# Patient Record
Sex: FEMALE | Race: WHITE
[De-identification: names, ages, dates, MRNs, and addresses within clinical notes are randomized per-mention and may not be internally consistent; named-entity substitution may affect disease eponyms.]

---

## 2019-01-08 ENCOUNTER — HOSPITAL ENCOUNTER (INPATIENT)
Dept: HOSPITAL 97 - 4TH | Age: 84
LOS: 3 days | Discharge: HOME | DRG: 378 | End: 2019-01-11
Attending: INTERNAL MEDICINE | Admitting: INTERNAL MEDICINE
Payer: COMMERCIAL

## 2019-01-08 DIAGNOSIS — C50.919: ICD-10-CM

## 2019-01-08 DIAGNOSIS — Z79.01: ICD-10-CM

## 2019-01-08 DIAGNOSIS — Z90.11: ICD-10-CM

## 2019-01-08 DIAGNOSIS — K92.2: Primary | ICD-10-CM

## 2019-01-08 DIAGNOSIS — R63.4: ICD-10-CM

## 2019-01-08 DIAGNOSIS — C78.5: ICD-10-CM

## 2019-01-08 DIAGNOSIS — E78.5: ICD-10-CM

## 2019-01-08 DIAGNOSIS — K64.8: ICD-10-CM

## 2019-01-08 DIAGNOSIS — K29.40: ICD-10-CM

## 2019-01-08 DIAGNOSIS — R13.10: ICD-10-CM

## 2019-01-08 DIAGNOSIS — I71.4: ICD-10-CM

## 2019-01-08 DIAGNOSIS — I48.2: ICD-10-CM

## 2019-01-08 DIAGNOSIS — E55.9: ICD-10-CM

## 2019-01-08 DIAGNOSIS — K57.90: ICD-10-CM

## 2019-01-08 DIAGNOSIS — Z66: ICD-10-CM

## 2019-01-08 DIAGNOSIS — M15.9: ICD-10-CM

## 2019-01-08 DIAGNOSIS — D50.0: ICD-10-CM

## 2019-01-08 DIAGNOSIS — K44.9: ICD-10-CM

## 2019-01-08 DIAGNOSIS — K64.4: ICD-10-CM

## 2019-01-08 LAB
ALBUMIN SERPL BCP-MCNC: 3.3 G/DL (ref 3.4–5)
ALP SERPL-CCNC: 60 U/L (ref 45–117)
ALT SERPL W P-5'-P-CCNC: 10 U/L (ref 12–78)
AST SERPL W P-5'-P-CCNC: 8 U/L (ref 15–37)
BUN BLD-MCNC: 17 MG/DL (ref 7–18)
FERRITIN SERPL-MCNC: 11.1 NG/ML (ref 8–388)
GLUCOSE SERPLBLD-MCNC: 101 MG/DL (ref 74–106)
HCT VFR BLD CALC: 24.1 % (ref 36–45)
INR BLD: 1.39
IRON SERPL-MCNC: 14 UG/DL (ref 50–170)
LYMPHOCYTES # SPEC AUTO: 2.1 K/UL (ref 0.7–4.9)
MAGNESIUM SERPL-MCNC: 2.1 MG/DL (ref 1.8–2.4)
PMV BLD: 8.4 FL (ref 7.6–11.3)
POTASSIUM SERPL-SCNC: 4.4 MMOL/L (ref 3.5–5.1)
RBC # BLD: 3.18 M/UL (ref 3.86–4.86)
TRANSFERRIN SERPL-MCNC: 317 MG/DL (ref 200–360)

## 2019-01-08 PROCEDURE — 82607 VITAMIN B-12: CPT

## 2019-01-08 PROCEDURE — 86850 RBC ANTIBODY SCREEN: CPT

## 2019-01-08 PROCEDURE — 82747 ASSAY OF FOLIC ACID RBC: CPT

## 2019-01-08 PROCEDURE — 83735 ASSAY OF MAGNESIUM: CPT

## 2019-01-08 PROCEDURE — 86901 BLOOD TYPING SEROLOGIC RH(D): CPT

## 2019-01-08 PROCEDURE — 85014 HEMATOCRIT: CPT

## 2019-01-08 PROCEDURE — 82378 CARCINOEMBRYONIC ANTIGEN: CPT

## 2019-01-08 PROCEDURE — 36415 COLL VENOUS BLD VENIPUNCTURE: CPT

## 2019-01-08 PROCEDURE — 85025 COMPLETE CBC W/AUTO DIFF WBC: CPT

## 2019-01-08 PROCEDURE — 85018 HEMOGLOBIN: CPT

## 2019-01-08 PROCEDURE — 88305 TISSUE EXAM BY PATHOLOGIST: CPT

## 2019-01-08 PROCEDURE — 71046 X-RAY EXAM CHEST 2 VIEWS: CPT

## 2019-01-08 PROCEDURE — 86900 BLOOD TYPING SEROLOGIC ABO: CPT

## 2019-01-08 PROCEDURE — 84466 ASSAY OF TRANSFERRIN: CPT

## 2019-01-08 PROCEDURE — 83540 ASSAY OF IRON: CPT

## 2019-01-08 PROCEDURE — 82728 ASSAY OF FERRITIN: CPT

## 2019-01-08 PROCEDURE — 80048 BASIC METABOLIC PNL TOTAL CA: CPT

## 2019-01-08 PROCEDURE — 85730 THROMBOPLASTIN TIME PARTIAL: CPT

## 2019-01-08 PROCEDURE — 85610 PROTHROMBIN TIME: CPT

## 2019-01-08 PROCEDURE — 74177 CT ABD & PELVIS W/CONTRAST: CPT

## 2019-01-08 PROCEDURE — 88312 SPECIAL STAINS GROUP 1: CPT

## 2019-01-08 PROCEDURE — 80053 COMPREHEN METABOLIC PANEL: CPT

## 2019-01-08 RX ADMIN — METOPROLOL TARTRATE SCH MG: 50 TABLET, FILM COATED ORAL at 21:42

## 2019-01-08 RX ADMIN — SODIUM CHLORIDE SCH MLS: 0.9 INJECTION, SOLUTION INTRAVENOUS at 21:43

## 2019-01-08 NOTE — XMS REPORT
Clinical Summary

 Created on:2019



Patient:Lauren Thornton

Sex:Female

:1930

External Reference #:[id number]





Demographics







 Address  [address] [place] [address]

 

 Mobile Phone  [phone]

 

 Home Phone  [phone]

 

 Email Address  [email or web address]

 

 Preferred Language  English

 

 Marital Status  

 

 Quaker Affiliation  Unknown

 

 Race  White

 

 Ethnic Group  Not  or 









Author







 Organization  [facility]

 

 Address  [address] [place] [address]









Support







 Name  Relationship  Address  Phone

 

 Lilibeth Smith  Unavailable  Unavailable  [phone]









Care Team Providers







 Name  Role  Phone

 

 Bradley Jackson MD  Primary Care Provider  [phone]









Allergies







 Active Allergy  Reactions  Severity  Noted Date  Comments

 

 Codeine      2018  No pain medication







Medications







 Medication  Sig  Dispensed  Refills  Start Date  End Date  Status

 

 warfarin (COUMADIN) 2.5  Take 2.5 mg by    0      Active



 MG tablet  mouth daily.          

 

 carvedilol (COREG) 12.5  Take 12.5 mg by    0      Active



 MG tablet  mouth 2 (two)          



   times a day with          



   meals.          

 

 losartan (COZAAR) 50 MG  Take 50 mg by    0      Active



 tablet  mouth daily.          

 

 multivitamin with  Take 1 tablet by    0      Active



 minerals tablet  mouth daily.          







Active Problems







 Problem  Noted Date

 

 Essential hypertension  2018

 

 Abdominal aortic aneurysm (AAA) without rupture  2018

 

 Chronic atrial fibrillation  2018







Encounters







 Date  Type  Specialty  Care Team  Description

 

 2018  Office Visit  Cardiology  Carl Baez MD  Abdominal aortic 
aneurysm (AAA) without rupture (Primary Dx);



         Essential hypertension;



         Chronic atrial fibrillation



after 2018



Social History







 Tobacco Use  Types  Packs/Day  Years Used  Date

 

 Former Smoker  Cigarettes      









 Smokeless Tobacco: Never Used      









 Comments: quit 40yrs ago









 Alcohol Use  Drinks/Week  oz/Week  Comments

 

 No      









 Sex Assigned at Birth  Date Recorded

 

 Not on file  









 Job Start Date  Occupation  Industry

 

 Not on file  Not on file  Not on file









 Travel History  Travel Start  Travel End









 No recent travel history available.







Last Filed Vital Signs







 Vital Sign  Reading  Time Taken

 

 Blood Pressure  213/84  2018  2:52 PM CDT

 

 Pulse  67  2018  2:52 PM CDT

 

 Temperature  -  -

 

 Respiratory Rate  -  -

 

 Oxygen Saturation  -  -

 

 Inhaled Oxygen Concentration  -  -

 

 Weight  62.6 kg (138 lb)  2018  2:52 PM CDT

 

 Height  -  -

 

 Body Mass Index  -  -







Plan of Treatment







 Health Maintenance  Due Date  Last Done  Comments

 

 SHINGLES VACCINES (1 of 2)  1980    

 

 PNEUMOCOCCAL POLYSACCHARIDE VACCINE AGE 65 AND OVER  1995    

 

 PNEUMOCOCCAL-13  1995    

 

 INFLUENZA VACCINE  2018    







Procedures







 Procedure Name  Priority  Date/Time  Associated Diagnosis  Comments

 

 ECG 12-LEAD  Routine  2018  1:58 PM  Essential hypertension  Results for 
this



     CDT    procedure are in



         the results



         section.



after 2018



Results

ECG 12 lead (2018  1:58 PM CDT)





 Component  Value  Ref Range  Performed At

 

 Ventricular rate  66    [facility]

 

 Atrial rate  68    [facility]

 

 QRSD interval  78    [facility]

 

 QT interval  408    [facility]

 

 QTC interval  427    [facility]

 

 QRS axis 1  -75    [facility]

 

 T wave axis  81    [facility]

 

 EKG impression  Atrial fibrillation-Left axis    [facility]



   deviation-Anteroseptal infarct , age    



   undetermined-Abnormal ECG-No previous ECGs    



   available-Electronically Signed By Elham Griffiths MD ([id number]) on 2018 12:25:15 PM    









 Performing Organization  Address  City/State/Zipcode  Phone Number

 

 [facility]  [address] [place] [address]  



after 2018



Insurance







 Payer  Benefit Plan / Group  Subscriber ID  Type  Phone  Address

 

 MEDICARE  MEDICARE PART A AND B  xxxxxxxxxx  Medicare [place], [place]









 Guarantor Name  Account Type  Relation to  Date of  Phone  Billing



     Patient  Birth    Address

 

 Lauren Thornton  Personal/Family  Self  1930  [phone] [address] [place], [place]



           [address]







Advance Directives

Patient has advance care planning documents on file. For more information, 
please contact:Aba Wood[address] [place] [address]

## 2019-01-09 LAB — HCT VFR BLD CALC: 33 % (ref 36–45)

## 2019-01-09 PROCEDURE — 30233N1 TRANSFUSION OF NONAUTOLOGOUS RED BLOOD CELLS INTO PERIPHERAL VEIN, PERCUTANEOUS APPROACH: ICD-10-PCS

## 2019-01-09 RX ADMIN — METOCLOPRAMIDE SCH MG: 5 INJECTION, SOLUTION INTRAMUSCULAR; INTRAVENOUS at 16:53

## 2019-01-09 RX ADMIN — METOPROLOL TARTRATE SCH MG: 50 TABLET, FILM COATED ORAL at 21:26

## 2019-01-09 RX ADMIN — METOCLOPRAMIDE SCH MG: 5 INJECTION, SOLUTION INTRAMUSCULAR; INTRAVENOUS at 13:46

## 2019-01-09 RX ADMIN — SODIUM CHLORIDE SCH MG: 0.9 INJECTION, SOLUTION INTRAVENOUS at 09:00

## 2019-01-09 RX ADMIN — METOCLOPRAMIDE SCH MG: 5 INJECTION, SOLUTION INTRAMUSCULAR; INTRAVENOUS at 23:31

## 2019-01-09 RX ADMIN — METOPROLOL TARTRATE SCH MG: 50 TABLET, FILM COATED ORAL at 12:54

## 2019-01-09 NOTE — RAD REPORT
EXAM DESCRIPTION:  CTAbdomen   Pelvis W Contrast - 1/9/2019 10:21 am

 

CLINICAL HISTORY:  Abdominal pain.

abd pain, weight loss

 

COMPARISON:  Abdomen   Pelvis W Contrast dated 2/28/2018; CT ABD PELVIS W CONTRAST dated 7/31/2008

 

TECHNIQUE:  Biphasic CT imaging of the abdomen and pelvis was performed with 100 ml non-ionic IV cont
rast.

 

All CT scans are performed using dose optimization technique as appropriate and may include automated
 exposure control or mA/KV adjustment according to patient size.

 

FINDINGS:  Small bilateral pleural effusions are noted. The inferior lung bases are emphysematous.Mil
d cardiomegaly.

 

Diffuse fatty liver is seen. The spleen, adrenal glands are within normal limits. 26 mm benign-appear
ing cyst is present right kidney. 3 mm stone is present inferior calyx right kidney. No significant h
ydronephrosis.

 

Pancreas appears mildly atrophic. Small cystic area again seen in the body of the pancreas. The size 
of the cystic lesions measures approximately 20 x 15 mm, appearing slightly increased in size since t
he comparative study (previously 20 x 12 mm).

 

No bowel obstruction, free air, intra-abdominal free fluid or abscess. Infrarenal abdominal aortic an
eurysm is present measuring 4.8 cm in anterior-posterior dimension. This represents no significant ch
nu in size since comparative study. The appendix appears absent. No evidence of significant lymphad
enopathy.

 

Lumbar postsurgical changes are evident. Mild fluid is seen dependently in the pelvis.

 

IMPRESSION:  Hypodense pancreatic body lesions have mildly increased in size since the comparative st
udy. Cystadenoma or IPMN is the favored etiology.

 

Stable infrarenal abdominal aortic aneurysm.

 

Nonobstructing inferior right renal stone.

 

Small bilateral pleural effusions.

## 2019-01-09 NOTE — RAD REPORT
EXAM DESCRIPTION:  RAD - Chest Pa And Lat (2 Views) - 1/9/2019 10:15 am

 

CLINICAL HISTORY:  weight loss

Chest pain.

 

COMPARISON:  Chest Pa And Lat (2 Views) dated 3/18/2017; Chest Pa And Lat (2 Views) dated 1/9/2017

 

FINDINGS:  Emphysematous changes are present. Trace right pleural effusion. The heart is mildly moder
ately enlarged. Aortic atherosclerosis. No displaced fractures. Right axillary harpreet dissection clips
.

 

IMPRESSION:  Diffuse COPD.

 

Trace right pleural fluid.

## 2019-01-09 NOTE — HP
Date of Admission:  01/08/2019



Reason For Admission:  Anemia.



History Of Present Illness:  An 88-year-old female patient who had outpatient 
blood work done yesterday, and today her hemoglobin was reported to be 6.4.  So
, the patient was contacted with these results and was advised to get admitted 
to the hospital.  Her family took her to the hospital this evening per 
recommendation and the patient was admitted to the hospital.  I went to check 
on her.  The patient's granddaughter was with her at bedside.  She denies any 
blood in stool.  She has some vague abdominal discomfort, some constipation.  
Her appetite has been poor and over period of time she is slowly losing weight.
  She lives at home by herself and today family reported that she is having 
some trouble swallowing also.  She denies any black stool, blood in stool, 
blood in urine.  No hematemesis. Patient is symptomatic from this anemia, she 
has weakness, tiredness and feels sleepy more than usual. 



Allergies:  CODEINE, DIAZEPAM, DEMEROL, MORPHINE.



Medications:  Warfarin which is being managed by Dr. Hedrick and takes 4 mg 
daily as the patient's family informs me, vitamin D 1000 units 2 times a day, 
vitamin B12 500 mcg daily, metoprolol 25 mg 2 times a day, losartan 50 mg p.o. 
daily.



Review of Systems:

Constitutional: Weight loss. 

GI:  As mentioned above. 

All other systems reviewed and negative.



Past Medical History:  Significant for pancreatic cyst, abdominal aortic 
aneurysm, atrial fibrillation, hypertension, chronic anticoagulation therapy, 
hyperlipidemia, diverticulosis, breast cancer.



Past Surgical History:  Left rotator cuff repair in 2001, appendectomy, 
cholecystectomy, hysterectomy, knee replacement and right breast mastectomy due 
to breast cancer.



Family History:  Not pertinent.



Social History:  Negative.



Physical Examination:

Vital signs:  Initial temperature 98.2, pulse 93, respiratory rate 17, blood 
pressure 156/105, height 5 feet 3 inches, weight 131 pounds. 

General:  Awake, alert, oriented, not in distress. 

HEENT:  Head atraumatic, normocephalic.  Conjunctivae nonerythematous.  Sclerae 
white.  Mouth, no thrush or edema noted.  Ears/Nose, no mass, lesion, discharge 
noted. 

Neck:  Supple. No JVD, lymph nodes, bruit, thyromegaly noted. 

Lungs:  Bilateral good equal air entry. Clear to auscultation. No rhonchi.  No 
rales. 

Heart:  Normal heart sounds, no murmur or gallop. 

Abdomen:  Soft.  Some mild tenderness present.  No rebound tenderness.  No 
distention.  Bowel sounds normoactive.  No hepatosplenomegaly. 

Extremities:  Trace leg edema.  No calf tenderness. 

Skin:  No rash, ulcer, cellulitis. 

Lymphatics:  No lymph node enlargement in neck, supraclavicular, 
infraclavicular region. 

Neuro:  No focal neurological deficit. 

Chest:  Unremarkable. 

External Genitalia:  Deferred. 

Rectal:  Deferred.



Laboratory Data:  White count 4.7, hemoglobin 7.1, platelets 164.  INR 1.39.  
Sodium 142, potassium 4.4, chloride 108, bicarb 26, BUN 17, creatinine 1.19, 
glucose 101, total iron 14, TIBC 444, ferritin 11.1, B12 of 345, folic acid 
level pending.  Liver enzymes unremarkable.



Impression:  

1.   Anemia.

2.   Chronic atrial fibrillation.

3.   Hypertension.

4.   Weight loss.

5.   Dysphagia.

6.   Osteoarthritis, multiple sites.

7.   Hyperlipidemia.

8.   Diverticulosis.

9.   Breast cancer.

10.   Vitamin D deficiency.

11.   Abdominal aortic aneurysm.



Plan:  Admit the patient to hospital for further evaluation and management of 
this problem.  The patient is appropriate for inpatient and is expected to 
spend 2 midnights in hospital.  We will continue her home medications per order
, give her IV fluid per order.  SCD was ordered for DVT prophylaxis.  We will 
go ahead and start to make arrangements for 2 units of packed red cell blood 
transfusion for this symptomatic severe anemia.  Stool for guaiac was ordered.  
We will consult gastroenterologist, Dr. Saldana and tomorrow I will order a 
chest x-ray and CAT scan of abdomen.  We will get post transfusion hemoglobin 
and then decide if any further blood transfusion is necessary or not.  We will 
give IV Protonix.  Advanced directives discussed with the patient in presence 
of her granddaughter and the patient informed me clearly in the event of 
cardiopulmonary arrest she does not want any heroic measures like CPR, 
defibrillation or ventilator support and DNR order was written in the chart.





CANDI/MODL

DD:  01/08/2019 22:27:29   Voice ID:  [id number]

[email or web address]

## 2019-01-10 PROCEDURE — 0DBK8ZX EXCISION OF ASCENDING COLON, VIA NATURAL OR ARTIFICIAL OPENING ENDOSCOPIC, DIAGNOSTIC: ICD-10-PCS

## 2019-01-10 PROCEDURE — 0DB88ZX EXCISION OF SMALL INTESTINE, VIA NATURAL OR ARTIFICIAL OPENING ENDOSCOPIC, DIAGNOSTIC: ICD-10-PCS

## 2019-01-10 PROCEDURE — 0DBL8ZX EXCISION OF TRANSVERSE COLON, VIA NATURAL OR ARTIFICIAL OPENING ENDOSCOPIC, DIAGNOSTIC: ICD-10-PCS

## 2019-01-10 PROCEDURE — 0DB68ZX EXCISION OF STOMACH, VIA NATURAL OR ARTIFICIAL OPENING ENDOSCOPIC, DIAGNOSTIC: ICD-10-PCS

## 2019-01-10 RX ADMIN — SODIUM CHLORIDE SCH MLS: 0.9 INJECTION, SOLUTION INTRAVENOUS at 06:38

## 2019-01-10 RX ADMIN — METOPROLOL TARTRATE SCH MG: 50 TABLET, FILM COATED ORAL at 22:00

## 2019-01-10 RX ADMIN — SODIUM CHLORIDE, SODIUM LACTATE, POTASSIUM CHLORIDE, AND CALCIUM CHLORIDE ONE MLS: .6; .31; .03; .02 INJECTION, SOLUTION INTRAVENOUS at 11:22

## 2019-01-10 RX ADMIN — METOPROLOL TARTRATE SCH: 50 TABLET, FILM COATED ORAL at 09:00

## 2019-01-10 RX ADMIN — SODIUM CHLORIDE SCH MG: 0.9 INJECTION, SOLUTION INTRAVENOUS at 09:04

## 2019-01-10 RX ADMIN — SODIUM CHLORIDE, SODIUM LACTATE, POTASSIUM CHLORIDE, AND CALCIUM CHLORIDE ONE MLS: .6; .31; .03; .02 INJECTION, SOLUTION INTRAVENOUS at 10:28

## 2019-01-10 NOTE — PN
Date of Progress Note:  01/09/2019



Subjective:  The patient was seen this morning for followup.  She was sitting in chair, getting her s
econd unit of blood transfusion.  Her son was present with her at bedside.  No new complaints or prob
lems reported overnight.



Objective:  Vital Signs:  Reviewed. 

HEENT Examination:  Unremarkable. 

Lungs:  Clear to auscultation. 

Heart:  Sounds normal. 

Abdomen:  Soft.  Bowel sounds normal.  No guarding, rigidity, tenderness, or distention. 

Extremity Exam:  No leg edema.



Impression:  

1.Anemia.

2.Pancreatic mass.

3.Abdominal aortic aneurysm.

4.Atrial fibrillation.

5.Hypertension.



Plan:  We will continue current antihypertensive medication per order.  Follow up on hemoglobin and t
hen decide if more blood transfusion is needed or not.  Consult gastroenterologist, Dr. Saldana, for E
GD and colonoscopy.  The patient has some dysphagia problem, is losing weight, and has anemia, so we 
need to make sure there is no underlying GI malignancy or any other explanation for her anemia proble
m.  Stool occult blood is pending.  CAT scan of the abdomen done today, results reviewed.  Pancreatic
 mass has gotten slightly larger than what it was last year.  An abdominal aortic aneurysm is stable.
  I will have to review my office record as the patient was asked to see subspecialist for this new parisa jimenez.  My recollection is probably that she did not 

want to follow up with any subspecialist.  Chest x-ray was unremarkable today.





CANDI/MODL

DD:  01/09/2019 19:57:41Voice ID:  [id number]

DT:  01/10/2019 01:19:21Report ID:  [id number]

## 2019-01-10 NOTE — ENDO RPT
[address], [address]





EGD PROCEDURE REPORT     EXAM DATE: 01/10/2019



PATIENT NAME:          Lauren Thornton          MR#:       [id number]



YOB: 1930     VISIT #:     [id number]

ATTENDING:     Michael Saldana Dr     STATUS:     inpatient - [facility]

ASSISTANT:      Jaylin Jacome, Wendy Jacome, and Maritza Cain RN





INDICATIONS:  The patient is a 88 yr old Female here for an EGD due to iron

deficiency anemia

PROCEDURE PERFORMED:     EGD with biopsy

MEDICATIONS:     Per Anesthesia.

TOPICAL ANESTHETIC:     none



CONSENT:  The patient understands the risks and benefits of the procedure and

understands that these risks include, but are not limited to: sedation,

allergic reaction, infection, perforation and/or bleeding. Alternative means of

evaluation and treatment include, among others: physical exam, x-rays, and/or

surgical intervention. The patient elects to proceed with this endoscopic

procedure.



DESCRIPTION OF PROCEDURE:  During intra-op preparation period all mechanical 

medical equipment was checked for proper function. Hand hygiene and appropriate

measures for infection prevention was taken.  Procedure, possible

complications, and alternatives including but not limited to the possibility of

bleeding, perforation, tear, infection, sepsis, need for surgery, need for

blood transfusion, and anesthesia related complications were explained to the

patient.  After the risks, benefits and alternatives of the procedure were

thoroughly explained, Informed consent was verified, confirmed and timeout was

successfully executed by the treatment team. The patient was  placed in the

left lateral position.  The patient was anesthetized with topical anesthesia.

Through the anesthetized oropharyngeal area, the scope was passed without any

difficulty.  The [id number] ([id number]) endoscope was introduced through the mouth

and advanced to the second portion of the duodenum.  Retroflexed views revealed

a small hiatal hernia.  The gastroscope was then slowly withdrawn and removed.



A small hiatal hernia was found Moderate Atrophic gastritis was found in the

total stomach.  Multiple biopsies were obtained and sent to pathology. Small

bowel biopsies obtained with history of iron deficiency anemia.







ADVERSE EVENTS:     There were no complications.

IMPRESSIONS:     1.  Small hiatal hernia

2.  Moderate atrophic gastritis in the total stomach, s/p biopsies

3.  Small bowel biopsies obtained with history of iron deficiency anemia





RECOMMENDATIONS:     1.  await biopsy results

2.  acid suppression therapy

REPEAT EXAM:





___________________________________

Michael Saldana Dr

eSigned:  Michael Saldana Dr 01/10/2019 12:15 PM





cc: Bradley Jackson



CPT CODES:

ICD9 CODES:





PATIENT NAME:  Lauren Thornton

MR#: [id number]

## 2019-01-10 NOTE — ENDO RPT
[address], [address]





COLONOSCOPY PROCEDURE REPORT     EXAM DATE: 01/10/2019



PATIENT NAME:      Lauren Thornton           MR #:      [id number]

YOB: 1930      VISIT #:     [id number]

ATTENDING:     Michael Saldana Dr     STATUS:     inpatient - [facility]

ASSISTANT:      Jaylin Jacome, Wendy Jacome, and Maritza Cain RN





INDICATIONS:  The patient is a 88 yr old Female here for a colonoscopy due to

iron deficiency anemia

PROCEDURE PERFORMED:     Colonoscopy with biopsy and Colonoscopy with biopsy -

cold polypectomy

MEDICATIONS:     Per Anesthesia.

ESTIMATED BLOOD LOSS:     None



CONSENT: The patient understands the risks and benefits of the procedure and

understands that these risks include, but are not limited to: sedation,

allergic reaction, infection, perforation and/or bleeding. Alternative means of

evaluation and treatment include, among others: physical exam, x-rays, and/or

surgical intervention. The patient elects to proceed with this endoscopic

procedure.



DESCRIPTION OF PROCEDURE:  During intra-op preparation period all mechanical 

medical equipment was checked for proper function. Hand hygiene and appropriate

measures for infection prevention was taken.  Procedure, possible

complications, alternatives including, but not limited to possibility of

bleeding, perforation, tear, infection, sepsis, need for surgery, need for

blood transfusion, were explained to the patient.  After the risks, benefits

and alternatives of the procedure were thoroughly explained, Informed consent

was verified, confirmed and timeout was successfully executed by the treatment

team.  The patient was placed in the left lateral position.   A digital rectal

exam was performed and revealed several skin tags and A digital rectal exam was

performed and revealed external hemorrhoids.  After appropriate level of

anesthesia, the scope was passed.  The [id number] ([id number]) and [id number]

([id number]) endoscope was introduced through the anus and advanced to the

terminal ileum which was intubated for a short distance. The quality of the

prep was good. The instrument was then slowly withdrawn as the colon was fully

examined.  Scope withdrawal time was 8 minutes.



COLON FINDINGS: A 1/3 circumferential, non-obstructing and ulcerated mildly

bleeding malignant tumor/mass, measuring 3 cm in length, was seen in the

ascending colon.  Multiple biopsies of the lesion were performed using cold

forceps.   A sessile polyp measuring 4 mm in size was found in the transverse

colon.  A polypectomy was performed with cold forceps.   Small internal and

external hemorrhoids were found.  Retroflexed views revealed small hemorrhoids.

The scope was then completely withdrawn from the patient and the procedure

terminated.







ADVERSE EVENTS:      There were no complications.

IMPRESSIONS:     1.  3 cm mildly bleeding malignant tumor/mass in the mid to

distal ascending colon; multiple biopsies of the lesion were performed

2.  4 mm sessile polyp in the transverse colon; polypectomy was performed with

cold forceps

3.  Small internal and external hemorrhoids

4.  Intubation to terminal ileum



RECOMMENDATIONS:     1.  await biopsy results

2.  avoid NSAIDS for 2 weeks

3.  surgery

4.  once pathology confirms neoplasia, oncology consult

RECALL:     Return in 1 year(s) for Colonoscopy.



_____________________________

Michael Saldana Dr

eSigned:  Michael Saldana Dr 01/10/2019 12:27 PM





cc:  Braldey Jackson



CPT CODES:

ICD9 CODES:





PATIENT NAME:  Lauren Thornton

MR#: [id number]

## 2019-01-11 LAB
ANISOCYTOSIS BLD QL: (no result)
BLD SMEAR INTERP: (no result)
BUN BLD-MCNC: 14 MG/DL (ref 7–18)
GLUCOSE SERPLBLD-MCNC: 80 MG/DL (ref 74–106)
HCT VFR BLD CALC: 31.3 % (ref 36–45)
LYMPHOCYTES # SPEC AUTO: 1.4 K/UL (ref 0.7–4.9)
MAGNESIUM SERPL-MCNC: 1.8 MG/DL (ref 1.8–2.4)
MORPHOLOGY BLD-IMP: (no result)
PMV BLD: 8.5 FL (ref 7.6–11.3)
POTASSIUM SERPL-SCNC: 3.8 MMOL/L (ref 3.5–5.1)
RBC # BLD: 3.97 M/UL (ref 3.86–4.86)

## 2019-01-11 RX ADMIN — METOPROLOL TARTRATE SCH MG: 50 TABLET, FILM COATED ORAL at 09:40

## 2019-01-11 RX ADMIN — SODIUM CHLORIDE SCH MG: 0.9 INJECTION, SOLUTION INTRAVENOUS at 09:42

## 2019-01-11 NOTE — PN
Date of Progress Note:  01/10/2019



Subjective:  The patient was seen this morning for followup and I saw her this 
evening as well.  This morning, when I saw her, she denied any complaints.



Objective:  Vital Signs:  Reviewed. 

HEENT Examination:  Unremarkable. 

Lungs:  Clear to auscultation. 

Heart:  Sounds normal. 

Abdomen:  Soft.  Bowel sounds normal.  No guarding, rigidity, tenderness, or 
distention. 

Extremity Exam:  No leg edema.



Laboratory Data:  Last hemoglobin was 10.3.  The patient had EGD and 
colonoscopy done today and Dr. Saldana did call me to discuss results.



Impression:  

1.   Anemia due to gastrointestinal blood loss.

2.   Atrial fibrillation.

3.   Hypertension.

4.   Weight loss.

5.   Rule out colon cancer.

6.   Hiatal hernia.

7.   Atrophic gastritis.



Plan:  Dr. Saldana called and informed me about findings of EGD and colonoscopy, 
and family is aware of this hopefully by next week, we should have the biopsy 
results back, but the patient was found to have mass in the ascending colon 
suspicious for colon cancer.  We will go ahead and repeat blood work tomorrow 
morning.  Depending on the patient's condition and blood test results, our plan 
is to discharge her to go home tomorrow, and once we have the biopsy results, 
then plan is to refer her to [facility].  The patient has evaluation done at 
[facility] for her pancreatic mass last year, and CAT scan done during this 
hospitalization shows increase in size of this mass, so she will need to go 
back there for further evaluation along with that.  Once we get the biopsy 
results on this ascending colon mass, we will make the referral as we are 
concerned about the colon cancer.  Details of plan of treatment discussed with 
the patient and family member this evening.  I also informed the patient and 

family members that the patient should not take any warfarin anymore because of 
this GI blood loss anemia problem.





CANDI/MODL

DD:  01/10/2019 20:24:21   Voice ID:  [id number]

DT:  01/11/2019 01:27:47   Report ID:  [id number]

CORRIE

## 2019-01-21 NOTE — DS
Date of Discharge:  01/11/2019



Disposition:  Discharged to go home.



Physical Examination:

HEENT:  Unremarkable. 

Lungs:  Clear to auscultation. 

Heart:  Sounds normal. 

Abdomen:  Soft.  Bowel sounds normal.  No guarding, rigidity, tenderness, or distention. 

Extremities:  No leg edema.



Discharge Medications And Order:  

1.Continue prior home medication except stop warfarin.

2.Take Hemocyte Plus 1 tablet by mouth daily.

3.Come to office for nonfasting blood test to be done on January 21st, January 28th and February 4, 2019 and follow up at my office on January 30, 2019 at 10 a.m.



Laboratory Data:  Labs done during this hospitalization:  Initial white count 4.7, hemoglobin 7.1, pl
atelets 164.  After blood transfusion, hemoglobin came up to 10.5 and last hemoglobin on the day of d
ischarge was 9.9, platelets 132, white count 4.9.  Her INR when she came in was 1.39.  Her chemistry 
when she came in; sodium 142, potassium 4.4, chloride 108, bicarb 26, BUN 17, creatinine 1.19, glucos
e 101.  Liver function tests unremarkable.  CEA 1.8.  B12 of 345, folic acid more than 1000.



Procedures:  Procedures done during this hospitalization include EGD and colonoscopy.  EGD showed a s
mall hiatal hernia, moderate atrophic gastritis.  Colonoscopy showed a 3 cm mass in the ascending col
on and biopsy came back positive for adenocarcinoma.  There was another 4 mm polyp in transverse colo
n, small internal and external hemorrhoid.



Hospital Course:  An 88-year-old female patient who was admitted to the hospital as a direct admissio
n after her outpatient routine blood work revealed hemoglobin of 6.4.  The patient was contacted, was
 requested to come to the hospital. After she was admitted to the hospital, we did repeat blood work.
  Hemoglobin came back 7.1.  Two units of PRBC blood transfusion was ordered for her and GI consultat
ion was requested from Dr. Saldana.  The patient takes warfarin which is being managed by her cardiolo
gist, Dr. Hedrick in [place].  After this significant anemia problem, it was recommended for her to 
discontinue warfarin completely and we did not give any warfarin during this hospitalization.  Dr. Rigo barfield saw her from GI, did EGD and colonoscopy with findings as mentioned above.  Details were discuss
ed with the patient's family members.  The patient's CAT scan had shown a pancreatic mass which appea
rs to be larger than what it was before and after the last CT scan, the patient did go to [facility]
 for evaluation on this pancreatic mass, but she has not returned back for any further followup or te
sting.  So with this in mind, we will have the patient follow up at [facility] on outpatient basis f
or this colon cancer as well as pancreatic mass and my office will initiate this referral and all the
se details were discussed with family members.  The patient and family both were advised not to take 
any warfarin.



Final Diagnoses:  

1.Anemia due to chronic gastrointestinal blood loss.

2.Colon cancer, ascending colon.

3.Hiatal hernia.

4.Chronic atrophic gastritis.

5.Chronic atrial fibrillation.

6.Hypertension.

7.Weight loss.

8.Dysphagia.

9.Osteoarthritis, multiple sites.

10.Breast cancer.

11.Hyperlipidemia.

12.Diverticulosis.

13.Vitamin D deficiency.

14.Abdominal aortic aneurysm.





CANDI/MODL

DD:  01/20/2019 12:25:08Voice ID:  [id number]

DT:  01/21/2019 03:46:52Report ID:  [id number]

## 2019-02-22 ENCOUNTER — HOSPITAL ENCOUNTER (INPATIENT)
Dept: HOSPITAL 97 - ER | Age: 84
LOS: 4 days | Discharge: HOSPICE | DRG: 683 | End: 2019-02-26
Attending: INTERNAL MEDICINE | Admitting: INTERNAL MEDICINE
Payer: COMMERCIAL

## 2019-02-22 DIAGNOSIS — I48.91: ICD-10-CM

## 2019-02-22 DIAGNOSIS — E86.9: ICD-10-CM

## 2019-02-22 DIAGNOSIS — K86.2: ICD-10-CM

## 2019-02-22 DIAGNOSIS — I10: ICD-10-CM

## 2019-02-22 DIAGNOSIS — D50.0: ICD-10-CM

## 2019-02-22 DIAGNOSIS — R53.1: ICD-10-CM

## 2019-02-22 DIAGNOSIS — K57.90: ICD-10-CM

## 2019-02-22 DIAGNOSIS — E78.5: ICD-10-CM

## 2019-02-22 DIAGNOSIS — Z66: ICD-10-CM

## 2019-02-22 DIAGNOSIS — C18.9: ICD-10-CM

## 2019-02-22 DIAGNOSIS — N17.9: Primary | ICD-10-CM

## 2019-02-22 DIAGNOSIS — K64.5: ICD-10-CM

## 2019-02-22 DIAGNOSIS — R19.7: ICD-10-CM

## 2019-02-22 DIAGNOSIS — R53.81: ICD-10-CM

## 2019-02-22 DIAGNOSIS — I71.4: ICD-10-CM

## 2019-02-22 DIAGNOSIS — Z85.3: ICD-10-CM

## 2019-02-22 DIAGNOSIS — Z90.11: ICD-10-CM

## 2019-02-22 DIAGNOSIS — Z88.5: ICD-10-CM

## 2019-02-22 LAB
ALBUMIN SERPL BCP-MCNC: 3.1 G/DL (ref 3.4–5)
ALP SERPL-CCNC: 65 U/L (ref 45–117)
ALT SERPL W P-5'-P-CCNC: 15 U/L (ref 12–78)
ANISOCYTOSIS BLD QL: (no result)
AST SERPL W P-5'-P-CCNC: 23 U/L (ref 15–37)
BUN BLD-MCNC: 29 MG/DL (ref 7–18)
GLUCOSE SERPLBLD-MCNC: 94 MG/DL (ref 74–106)
HCT VFR BLD CALC: 30.5 % (ref 36–45)
INR BLD: 1.04
LIPASE SERPL-CCNC: 148 U/L (ref 73–393)
LYMPHOCYTES # SPEC AUTO: 1.1 K/UL (ref 0.7–4.9)
MORPHOLOGY BLD-IMP: (no result)
PMV BLD: 8.4 FL (ref 7.6–11.3)
POTASSIUM SERPL-SCNC: 4.2 MMOL/L (ref 3.5–5.1)
RBC # BLD: 3.52 M/UL (ref 3.86–4.86)

## 2019-02-22 PROCEDURE — 80048 BASIC METABOLIC PNL TOTAL CA: CPT

## 2019-02-22 PROCEDURE — 93005 ELECTROCARDIOGRAM TRACING: CPT

## 2019-02-22 PROCEDURE — 99285 EMERGENCY DEPT VISIT HI MDM: CPT

## 2019-02-22 PROCEDURE — 83690 ASSAY OF LIPASE: CPT

## 2019-02-22 PROCEDURE — 85610 PROTHROMBIN TIME: CPT

## 2019-02-22 PROCEDURE — 80076 HEPATIC FUNCTION PANEL: CPT

## 2019-02-22 PROCEDURE — 74018 RADEX ABDOMEN 1 VIEW: CPT

## 2019-02-22 PROCEDURE — 97530 THERAPEUTIC ACTIVITIES: CPT

## 2019-02-22 PROCEDURE — 87045 FECES CULTURE AEROBIC BACT: CPT

## 2019-02-22 PROCEDURE — 36415 COLL VENOUS BLD VENIPUNCTURE: CPT

## 2019-02-22 PROCEDURE — 97116 GAIT TRAINING THERAPY: CPT

## 2019-02-22 PROCEDURE — 96360 HYDRATION IV INFUSION INIT: CPT

## 2019-02-22 PROCEDURE — 89055 LEUKOCYTE ASSESSMENT FECAL: CPT

## 2019-02-22 PROCEDURE — 96361 HYDRATE IV INFUSION ADD-ON: CPT

## 2019-02-22 PROCEDURE — 87177 OVA AND PARASITES SMEARS: CPT

## 2019-02-22 PROCEDURE — 87209 SMEAR COMPLEX STAIN: CPT

## 2019-02-22 PROCEDURE — 97162 PT EVAL MOD COMPLEX 30 MIN: CPT

## 2019-02-22 PROCEDURE — 85025 COMPLETE CBC W/AUTO DIFF WBC: CPT

## 2019-02-22 PROCEDURE — 87493 C DIFF AMPLIFIED PROBE: CPT

## 2019-02-22 PROCEDURE — 87046 STOOL CULTR AEROBIC BACT EA: CPT

## 2019-02-22 RX ADMIN — SODIUM CHLORIDE SCH: 0.9 INJECTION, SOLUTION INTRAVENOUS at 20:46

## 2019-02-22 NOTE — ER
Nurse's Notes                                                                                     

 [facility]                                                                

Name: Lauren Thornton                                                                             

Age: 88 yrs                                                                                       

Sex: Female                                                                                       

: 1930                                                                                   

MRN: [id number]                                                                                   

Arrival Date: 2019                                                                          

Time: 10:55                                                                                       

Account#: [id number]                                                                            

Bed 8                                                                                             

Private MD:                                                                                       

Diagnosis: Diarrhea, unspecified;Dehydration                                                      

                                                                                                  

Presentation:                                                                                     

                                                                                             

10:55 Presenting complaint: EMS states: DI[facility]HEA SINCE TAKING LAXATIVE LAST PM. Transition of aj1 

      care: patient was not received from another setting of care. Onset of symptoms is           

      unknown. Risk Assessment: Do you want to hurt yourself or someone else? Patient reports     

      no desire to harm self or others. Initial Sepsis Screen: Does the patient meet any 2        

      criteria? No. Patient's initial sepsis screen is negative. Does the patient have a          

      suspected source of infection? No. Patient's initial sepsis screen is negative. Care        

      prior to arrival: IV initiated. 20 GA, in the left forearm, Glucose check: 143.             

10:55 Method Of Arrival: EMS: [facility]                                                    aj1 

10:55 Acuity: [place] 3                                                                           aj1 

                                                                                                  

Triage Assessment:                                                                                

10:59 General: Appears distressed, uncomfortable, slender, Behavior is cooperative,           aj1 

      appropriate for age, anxious. Pain: Complains of pain in abdomen. EENT: No deficits         

      noted. Neuro: Level of Consciousness is awake, alert, obeys commands, Oriented to           

      person, place, time, situation, Appropriate for age. Cardiovascular: No deficits noted.     

      Respiratory: Airway is patent Respiratory effort is even, unlabored, Respiratory            

      pattern is regular, symmetrical. GI: Reports lower abdominal pain, upper abdominal          

      pain, diarrhea. : No signs and/or symptoms were reported regarding the genitourinary      

      system. Derm: No deficits noted. Musculoskeletal: Circulation, motion, and sensation        

      intact. Range of motion: intact in all extremities.                                         

                                                                                                  

Historical:                                                                                       

- Allergies:                                                                                      

10:59 Codeine;                                                                                aj1 

10:59 Demerol;                                                                                aj1 

10:59 Morphine;                                                                               aj1 

10:59 Tylenol-Codeine #3;                                                                     aj1 

10:59 Valium;                                                                                 aj1 

- Home Meds:                                                                                      

10:59 losartan 50 mg oral tab 1 tab once daily [Active]; Hemocyte-Plus 106 mg iron- 1 mg oral aj1 

      cap 1 cap once daily [Active]; metoprolol tartrate 25 mg Oral tab 1 tab 2 times per day     

      [Active];                                                                                   

- PMHx:                                                                                           

10:59 AAA; Atrial Fib; breast cancer; Hypertension;                                           aj1 

                                                                                                  

- Immunization history:: Adult Immunizations up to date.                                          

- Social history:: Smoking status: Patient/guardian denies using tobacco.                         

- Ebola Screening: : Patient negative for fever greater than or equal to 101.5 degrees            

  Fahrenheit, and additional compatible Ebola Virus Disease symptoms Patient denies               

  exposure to infectious person Patient denies travel to an Ebola-affected area in the            

  21 days before illness onset No symptoms or risks identified at this time.                      

                                                                                                  

                                                                                                  

Screenin:02 Abuse screen: Denies threats or abuse. Denies injuries from another. Nutritional        aj1 

      screening: No deficits noted. Tuberculosis screening: No symptoms or risk factors           

      identified. Fall Risk None identified.                                                      

                                                                                                  

Assessment:                                                                                       

11:01 General: SEE TRIAGE NOTE.                                                               aj1 

13:00 General: PT INCONTINENT TO DIARRHEAL STOOL. PROVIDER NOTIFIED.                          bp  

15:50 Reassessment: PT UNABLE TO PROVIDE UOP OR STOOL SAMPLE 2/2 INCONTINENCE. PROVIDER AWARE.bp  

19:10 General: Appears in no apparent distress. uncomfortable, Behavior is cooperative,       tl2 

      appropriate for age, anxious. Pain: Complains of pain in abdomen, rectum. Neuro: Level      

      of Consciousness is awake, alert, obeys commands, Oriented to person, place, time,          

      situation. Cardiovascular: Denies chest pain. Respiratory: Airway is patent Respiratory     

      effort is even, unlabored, Respiratory pattern is regular, symmetrical. GI: Reports         

      diarrhea, hemorrhoids, incontinence. : Reports incontinence. Derm: Skin is fragile,       

      Skin is pink, warm \T\ dry.                                                                 

                                                                                                  

Vital Signs:                                                                                      

10:59  / 50; Pulse 70; Resp 16; Temp 98.9; Pulse Ox 100% ; Weight 58.51 kg; Height 5    aj1 

      ft. 5 in. (165.10 cm);                                                                      

12:00  / 66; Pulse 54; Resp 18; Pulse Ox 95% ;                                          bp  

13:50  / 89; Pulse 95; Resp 16; Pulse Ox 100% ;                                         bp  

15:50  / 61; Pulse 71; Resp 16; Pulse Ox 100% ;                                         bp  

19:36  / 67; Pulse 62; Resp 18; Pulse Ox 99% on R/A;                                    tl2 

10:59 Body Mass Index 21.47 (58.51 kg, 165.10 cm)                                             aj1 

                                                                                                  

ED Course:                                                                                        

10:55 Patient arrived in ED.                                                                  aj1 

10:56 Triage completed.                                                                       aj1 

11:01 Arm band placed on.                                                                     aj1 

11:02 Patient has correct armband on for positive identification. Bed in low position. Call   aj1 

      light in reach. Side rails up X2. Adult w/ patient.                                         

11:05 Ese Mckeon, RN is Primary Nurse.                                                   aj1 

11:10 EKG done, by EKG tech. reviewed by Pablo Graham MD.                                    at1 

11:16 Jose Peña NP is PHCP.                                                           pm1 

11:16 Pablo Graham MD is Attending Physician.                                              pm1 

11:37 Primary Nurse role handed off by Ese Mckeon RN                                    bp  

11:37 Tyrell Hernandez RN is Primary Nurse.                                                    bp  

13:25 X-ray completed. Portable x-ray completed in exam room. Patient tolerated procedure     jw2 

      well.                                                                                       

17:02 RADHA Jackson MD is Hospitalizing Provider.                                                  pm1 

19:10 No provider procedures requiring assistance completed. Patient admitted, IV remains in  tl2 

      place. 20 g L FA placed during previous shift.                                              

20:14 Primary Nurse role handed off by Tyrell Hernandez RN                                     ed1 

20:23 Cleaned of incontinence.                                                                tl2 

                                                                                                  

Administered Medications:                                                                         

14:45 Drug: NS 0.9% 500 ml Route: IV; Rate: bolus; Site: right forearm;                       bp  

17:15 Follow up: IV Status: Completed infusion                                                bp  

14:45 Drug: NS 0.9% 1000 ml Route: IV; Rate: 100 ml/hr; Site: right forearm;                  bp  

20:27 Follow up: IV Status: Infusion continued upon admission                                 tl2 

17:20 Drug: XANax Tablet 0.25 mg Route: PO;                                                   bp  

17:42 Follow up: Response: No adverse reaction                                                bp  

                                                                                                  

                                                                                                  

Outcome:                                                                                          

17:02 Decision to Hospitalize by Provider.                                                    pm1 

19:10 Admitted to Med/surg accompanied by tech, via stretcher, room 218, with chart, Report   tl2 

      called to  NIGEL Valencia                                                                      

19:10 Condition: stable                                                                           

19:10 Discharge instructions given to patient, Instructed on the need for admit.                  

20:27 Patient left the ED.                                                                    tl2 

                                                                                                  

Signatures:                                                                                       

Ese Mckeon, NIGEL                     RN   aj1                                                  

Joan Hubbard RN RN   ed1                                                  

Hilda Renee, EKG Tech              EKG Tat1                                                  

Jose Peña, MELISSA                    NP   pm1                                                  

Kena Mendoza                                jw2                                                  

Bryan, Maritza, RN                        RN   tl2                                                  

Tyrell Hernandez, RN                      RN   bp                                                   

                                                                                                  

**************************************************************************************************

## 2019-02-22 NOTE — EDPHYS
Physician Documentation                                                                           

 [facility]                                                                

Name: Lauren Thornton                                                                             

Age: 88 yrs                                                                                       

Sex: Female                                                                                       

: 1930                                                                                   

MRN: [id number]                                                                                   

Arrival Date: 2019                                                                          

Time: 10:55                                                                                       

Account#: [id number]                                                                            

Bed 8                                                                                             

Private MD:                                                                                       

ED Physician Pablo Graham                                                                       

HPI:                                                                                              

                                                                                             

12:00 This 88 yrs old  Female presents to ER via EMS with complaints of Diarrhea,    pm1 

      General Weakness.                                                                           

12:00 The patient presents to the emergency department with diarrhea, that is continuous.     pm1 

      Onset: The symptoms/episode began/occurred yesterday. Possible causes: laxatives. The       

      symptoms are aggravated by nothing. The symptoms are alleviated by nothing. Associated      

      signs and symptoms: Pertinent negatives: abdominal pain, dysuria, fever, vomiting.          

      Severity of symptoms: in the emergency department the symptoms are worse. The patient       

      has experienced similar episodes in the past, a few times. Patient recently diagnosed       

      with colon cancer about 1 month ago. Diagnosed by colonoscopy. Non treatable colon          

      cancer per MD Faulkner. Patient DNR status. Patient takes laxatives once per week           

      typically to treat constipation due to colon cancer. Took laxative two nights ago and       

      has continuous diarrhea since. Patient with generalized weakness.                           

                                                                                                  

Historical:                                                                                       

- Allergies:                                                                                      

10:59 Codeine;                                                                                aj1 

10:59 Demerol;                                                                                aj1 

10:59 Morphine;                                                                               aj1 

10:59 Tylenol-Codeine #3;                                                                     aj1 

10:59 Valium;                                                                                 aj1 

- Home Meds:                                                                                      

10:59 losartan 50 mg oral tab 1 tab once daily [Active]; Hemocyte-Plus 106 mg iron- 1 mg oral aj1 

      cap 1 cap once daily [Active]; metoprolol tartrate 25 mg Oral tab 1 tab 2 times per day     

      [Active];                                                                                   

- PMHx:                                                                                           

10:59 AAA; Atrial Fib; breast cancer; Hypertension;                                           aj1 

                                                                                                  

- Immunization history:: Adult Immunizations up to date.                                          

- Social history:: Smoking status: Patient/guardian denies using tobacco.                         

- Ebola Screening: : Patient negative for fever greater than or equal to 101.5 degrees            

  Fahrenheit, and additional compatible Ebola Virus Disease symptoms Patient denies               

  exposure to infectious person Patient denies travel to an Ebola-affected area in the            

  21 days before illness onset No symptoms or risks identified at this time.                      

                                                                                                  

                                                                                                  

ROS:                                                                                              

12:00 Constitutional: Negative for fever, chills, and weight loss, Eyes: Negative for injury, pm1 

      pain, redness, and discharge, ENT: Negative for injury, pain, and discharge, Neck:          

      Negative for injury, pain, and swelling, Cardiovascular: Negative for chest pain,           

      palpitations, and edema, Respiratory: Negative for shortness of breath, cough,              

      wheezing, and pleuritic chest pain.                                                         

12:00 : Negative for injury, bleeding, discharge, and swelling, MS/Extremity: Negative for      

      injury and deformity, Skin: Negative for injury, rash, and discoloration, Neuro:            

      Negative for headache, weakness, numbness, tingling, and seizure.                           

12:00 Abdomen/GI: Positive for diarrhea, Negative for abdominal pain, nausea and vomiting.        

12:00 Back: Positive for chronic back pain from surgery 20 years ago.                             

                                                                                                  

Exam:                                                                                             

12:00 Constitutional:  This is a well developed, well nourished patient who is awake, alert,  pm1 

      and in no acute distress. Head/Face:  Normocephalic, atraumatic. Eyes:  Pupils equal        

      round and reactive to light, extra-ocular motions intact.  Lids and lashes normal.          

      Conjunctiva and sclera are non-icteric and not injected.  Cornea within normal limits.      

      Periorbital areas with no swelling, redness, or edema. ENT:  Nares patent. No nasal         

      discharge, no septal abnormalities noted.  Tympanic membranes are normal and external       

      auditory canals are clear.  Oropharynx with no redness, swelling, or masses, exudates,      

      or evidence of obstruction, uvula midline.  Mucous membranes moist. Neck:  Trachea          

      midline, no thyromegaly or masses palpated, and no cervical lymphadenopathy.  Supple,       

      full range of motion without nuchal rigidity, or vertebral point tenderness.  No            

      Meningismus. Chest/axilla:  Normal chest wall appearance and motion.  Nontender with no     

      deformity.  No lesions are appreciated. Cardiovascular:  Regular rate and rhythm with a     

      normal S1 and S2.  No gallops, murmurs, or rubs.  Normal PMI, no JVD.  No pulse             

      deficits. Respiratory:  Lungs have equal breath sounds bilaterally, clear to                

      auscultation and percussion.  No rales, rhonchi or wheezes noted.  No increased work of     

      breathing, no retractions or nasal flaring. Abdomen/GI:  Soft, non-tender, with normal      

      bowel sounds.  No distension or tympany.  No guarding or rebound.  No evidence of           

      tenderness throughout. Back:  No spinal tenderness.  No costovertebral tenderness.          

      Full range of motion. Skin:  Warm, dry with normal turgor.  Normal color with no            

      rashes, no lesions, and no evidence of cellulitis. MS/ Extremity:  Pulses equal, no         

      cyanosis.  Neurovascular intact.  Full, normal range of motion.                             

12:00 Neuro: Orientation: is normal, Motor: is normal, moves all fours.                           

                                                                                                  

Vital Signs:                                                                                      

10:59  / 50; Pulse 70; Resp 16; Temp 98.9; Pulse Ox 100% ; Weight 58.51 kg; Height 5    aj1 

      ft. 5 in. (165.10 cm);                                                                      

12:00  / 66; Pulse 54; Resp 18; Pulse Ox 95% ;                                          bp  

13:50  / 89; Pulse 95; Resp 16; Pulse Ox 100% ;                                         bp  

15:50  / 61; Pulse 71; Resp 16; Pulse Ox 100% ;                                         bp  

19:36  / 67; Pulse 62; Resp 18; Pulse Ox 99% on R/A;                                    tl2 

10:59 Body Mass Index 21.47 (58.51 kg, 165.10 cm)                                             aj1 

                                                                                                  

MDM:                                                                                              

11:17 Patient medically screened.                                                             pm1 

14:59 Data reviewed: vital signs. Data interpreted: Pulse oximetry: on room air is 100 %.     pm1 

      Interpretation: normal.                                                                     

15:16 Counseling: I had a detailed discussion with the patient and/or guardian regarding: the pm1 

      historical points, exam findings, and any diagnostic results supporting the                 

      discharge/admit diagnosis, lab results, radiology results.                                  

16:07 Physician consultation: A Renetta BLOOM was called at 16:07, was contacted at 16:07, Will     pm1 

      call back.                                                                                  

17:04 Physician consultation: A Renetta BLOOM regarding admission, patient's condition, would like  pm1 

      medications started, Xanax 0.25 mg PO once now , in the emergency department to see         

      patient at 16:50, DNR status, Continue IV fluids.                                           

                                                                                                  

                                                                                             

11:33 Order name: Basic Metabolic Panel                                                       pm1 

                                                                                             

11:33 Order name: CBC with Diff                                                               pm                                                                                             

11:33 Order name: Creatinine for Radiology                                                    pm1 

                                                                                             

11:33 Order name: Hepatic Function                                                            pm                                                                                             

11:33 Order name: Lipase                                                                      pm                                                                                             

11:33 Order name: Urine Microscopic Only                                                      pm                                                                                             

11:33 Order name: PT-INR                                                                      pm                                                                                             

11:47 Order name: CDIFF                                                                       pm                                                                                             

11:47 Order name: Fecal Leukocyte Stain                                                       pm                                                                                             

11:47 Order name: Ova And Parasites                                                           pm                                                                                             

11:47 Order name: Stool Culture                                                               pm                                                                                             

12:21 Order name: CBC with Automated Diff; Complete Time: 13:44                               EDMS

                                                                                             

12:23 Order name: Protime (+INR); Complete Time: 12:33                                        EDMS

                                                                                             

12:24 Order name: Creatinine (Radiology Only); Complete Time: 12:33                           EDMS

                                                                                             

11:33 Order name: IV Saline Lock; Complete Time: 12:40                                        pm1 

                                                                                             

11:33 Order name: Labs collected and sent; Complete Time: 12:40                               pm1 

                                                                                             

12:31 Order name: Basic Metabolic Panel; Complete Time: 12:33                                 EDMS

                                                                                             

12:31 Order name: Liver (Hepatic) Function; Complete Time: 12:33                              EDMS

                                                                                             

12:31 Order name: Lipase; Complete Time: 12:33                                                EDMS

                                                                                             

12:49 Order name: Abdomen 1 View (KUB) XRAY                                                   pm                                                                                             

13:20 Order name: Manual Differential; Complete Time: 13:44                                   EDMS

                                                                                             

13:40 Order name: RAD; Complete Time: 13:44                                                   EDMS

                                                                                                  

Administered Medications:                                                                         

14:45 Drug: NS 0.9% 500 ml Route: IV; Rate: bolus; Site: right forearm;                       bp  

17:15 Follow up: IV Status: Completed infusion                                                bp  

14:45 Drug: NS 0.9% 1000 ml Route: IV; Rate: 100 ml/hr; Site: right forearm;                  bp  

20:27 Follow up: IV Status: Infusion continued upon admission                                 tl2 

17:20 Drug: XANax Tablet 0.25 mg Route: PO;                                                   bp  

17:42 Follow up: Response: No adverse reaction                                                bp  

                                                                                                  

                                                                                                  

Disposition:                                                                                      

19 17:02 Hospitalization ordered by RADHA Jackson for Inpatient Admission. Preliminary            

  diagnosis are Diarrhea, unspecified, Dehydration.                                               

- Bed requested for Telemetry/MedSurg (Inpatient).                                                

- Status is Inpatient Admission.                                                              tl2 

- Condition is Stable.                                                                            

- Problem is new.                                                                                 

- Symptoms have improved.                                                                         

UTI on Admission? No                                                                              

                                                                                                  

                                                                                                  

                                                                                                  

Addendum:                                                                                         

2019                                                                                        

     07:01 Co-signature as Attending Physician, Pablo Graham MD I agree with the assessment and   k
dr

           plan of care.                                                                          

                                                                                                  

Signatures:                                                                                       

Dispatcher [employer]                           EDMS                                                 

Ese Mckeon, RN                     RN   aj1                                                  

Pablo Graham MD MD   [facility]                                                  

Jose Peña, MELISSA                    NP   pm1                                                  

Maritza Bryan RN                        RN   tl2                                                  

Estrella Thakur RN                   RN   df                                                   

Tyrell Hernandez RN                      RN   bp                                                   

                                                                                                  

Corrections: (The following items were deleted from the chart)                                    

                                                                                             

18:11 17:02 Hospitalization Ordered by A Renetta BLOOM for Inpatient Admission. Preliminary         df  

      diagnosis is Diarrhea, unspecified; Dehydration. Bed requested for Telemetry/MedSurg        

      (Inpatient). Status is Inpatient Admission. Condition is Stable. Problem is new.            

      Symptoms have improved. UTI on Admission? No. pm1                                           

20:27 18:11 2019 17:02 Hospitalization Ordered by A Renetta BLOOM for Inpatient Admission.    tl2 

      Preliminary diagnosis is Diarrhea, unspecified; Dehydration. Bed requested for              

      Telemetry/MedSurg (Inpatient). Status is Inpatient Admission. Condition is Stable.          

      Problem is new. Symptoms have improved. UTI on Admission? No. df                            

                                                                                                  

**************************************************************************************************

## 2019-02-22 NOTE — XMS REPORT
Clinical Summary

 Created on:2019



Patient:Lauren Thornton

Sex:Female

:1930

External Reference #:[id number]





Demographics







 Address  [address] [place] [address]

 

 Mobile Phone  [phone]

 

 Home Phone  [phone]

 

 Email Address  [email or web address]

 

 Preferred Language  English

 

 Marital Status  

 

 Rastafarian Affiliation  Unknown

 

 Race  White

 

 Ethnic Group  Not  or 









Author







 Organization  [facility]

 

 Address  [address] [place] [address]









Support







 Name  Relationship  Address  Phone

 

 Lilibeth Smith  Unavailable  Unavailable  [phone]









Care Team Providers







 Name  Role  Phone

 

 Bradley Jackson MD  Primary Care Provider  [phone]









Allergies







 Active Allergy  Reactions  Severity  Noted Date  Comments

 

 Codeine      2018  No pain medication







Medications







 Medication  Sig  Dispensed  Refills  Start Date  End Date  Status

 

 warfarin (COUMADIN) 2.5  Take 2.5 mg by    0      Active



 MG tablet  mouth daily.          

 

 carvedilol (COREG) 12.5  Take 12.5 mg by    0      Active



 MG tablet  mouth 2 (two)          



   times a day with          



   meals.          

 

 losartan (COZAAR) 50 MG  Take 50 mg by    0      Active



 tablet  mouth daily.          

 

 multivitamin with  Take 1 tablet by    0      Active



 minerals tablet  mouth daily.          







Active Problems







 Problem  Noted Date

 

 Essential hypertension  2018

 

 Abdominal aortic aneurysm (AAA) without rupture  2018

 

 Chronic atrial fibrillation  2018







Encounters







 Date  Type  Specialty  Care Team  Description

 

 2018  Office Visit  Cardiology  Carl Baez MD  Abdominal aortic 
aneurysm (AAA) without rupture (Primary Dx);



         Essential hypertension;



         Chronic atrial fibrillation



after 2018



Social History







 Tobacco Use  Types  Packs/Day  Years Used  Date

 

 Former Smoker  Cigarettes      









 Smokeless Tobacco: Never Used      









 Comments: quit 40yrs ago









 Alcohol Use  Drinks/Week  oz/Week  Comments

 

 No      









 Sex Assigned at Birth  Date Recorded

 

 Not on file  









 Job Start Date  Occupation  Industry

 

 Not on file  Not on file  Not on file









 Travel History  Travel Start  Travel End









 No recent travel history available.







Last Filed Vital Signs







 Vital Sign  Reading  Time Taken

 

 Blood Pressure  213/84  2018  2:52 PM CDT

 

 Pulse  67  2018  2:52 PM CDT

 

 Temperature  -  -

 

 Respiratory Rate  -  -

 

 Oxygen Saturation  -  -

 

 Inhaled Oxygen Concentration  -  -

 

 Weight  62.6 kg (138 lb)  2018  2:52 PM CDT

 

 Height  -  -

 

 Body Mass Index  -  -







Plan of Treatment







 Health Maintenance  Due Date  Last Done  Comments

 

 SHINGLES VACCINES (#1)  1980    

 

 65+ PNEUMOCOCCAL VACCINE (1 of 2 - PCV13)  1995    

 

 PNEUMOCOCCAL POLYSACCHARIDE VACCINE AGE 65 AND OVER  1995    

 

 INFLUENZA VACCINE  2018    







Procedures







 Procedure Name  Priority  Date/Time  Associated Diagnosis  Comments

 

 ECG 12-LEAD  Routine  2018  1:58 PM  Essential hypertension  Results for 
this



     CDT    procedure are in



         the results



         section.



after 2018



Results

ECG 12 lead (2018  1:58 PM CDT)





 Component  Value  Ref Range  Performed At

 

 Ventricular rate  66    [facility]

 

 Atrial rate  68    [facility]

 

 QRSD interval  78    [facility]

 

 QT interval  408    [facility]

 

 QTC interval  427    [facility]

 

 QRS axis 1  -75    [facility]

 

 T wave axis  81    [facility]

 

 EKG impression  Atrial fibrillation-Left axis    [facility]



   deviation-Anteroseptal infarct , age    



   undetermined-Abnormal ECG-No previous ECGs    



   available-Electronically Signed By Elham Griffiths MD ([id number]) on 2018 12:25:15 PM    









 Performing Organization  Address  City/State/Zipcode  Phone Number

 

 [facility]  [address] [place] [address]  



after 2018



Insurance







 Payer  Benefit Plan / Group  Subscriber ID  Type  Phone  Address

 

 MEDICARE  MEDICARE PART A AND B  xxxxxxxxxx  Medicare [place], [place]









 Guarantor Name  Account Type  Relation to  Date of  Phone  Billing



     Patient  Birth    Address

 

 Lauren Thornton  Personal/Family  Self  1930  [phone] [address]



           [address]







Advance Directives

Patient has advance care planning documents on file. For more information, 
please contact:[address] [place] [address]

## 2019-02-23 LAB
BUN BLD-MCNC: 22 MG/DL (ref 7–18)
GLUCOSE SERPLBLD-MCNC: 72 MG/DL (ref 74–106)
HCT VFR BLD CALC: 27.9 % (ref 36–45)
LYMPHOCYTES # SPEC AUTO: 1.1 K/UL (ref 0.7–4.9)
PMV BLD: 7.7 FL (ref 7.6–11.3)
POTASSIUM SERPL-SCNC: 4.1 MMOL/L (ref 3.5–5.1)
RBC # BLD: 3.16 M/UL (ref 3.86–4.86)

## 2019-02-23 RX ADMIN — Medication SCH EA: at 12:21

## 2019-02-23 RX ADMIN — SODIUM CHLORIDE SCH MLS: 0.9 INJECTION, SOLUTION INTRAVENOUS at 04:14

## 2019-02-23 RX ADMIN — SODIUM CHLORIDE SCH MLS: 0.9 INJECTION, SOLUTION INTRAVENOUS at 12:55

## 2019-02-23 RX ADMIN — Medication SCH EA: at 21:59

## 2019-02-23 RX ADMIN — SODIUM CHLORIDE SCH: 0.9 INJECTION, SOLUTION INTRAVENOUS at 16:46

## 2019-02-23 RX ADMIN — HYDROCORTISONE ACETATE SCH MG: 25 SUPPOSITORY RECTAL at 21:59

## 2019-02-23 RX ADMIN — Medication SCH EA: at 12:20

## 2019-02-23 RX ADMIN — HYDROCORTISONE ACETATE SCH MG: 25 SUPPOSITORY RECTAL at 11:00

## 2019-02-23 NOTE — EKG
Test Date:    2019-02-22               Test Time:    11:02:09

Technician:   HUMBERTO                                     

                                                     

MEASUREMENT RESULTS:                                       

Intervals:                                           

Rate:         83                                     

NJ:                                                  

QRSD:         84                                     

QT:           384                                    

QTc:          451                                    

Axis:                                                

P:                                                   

NJ:                                                  

QRS:          39                                     

T:            80                                     

                                                     

INTERPRETIVE STATEMENTS:                                       

                                                     

Atrial fibrillation

Anteroseptal infarct, age undetermined

Abnormal ECG

Compared to ECG 03/09/2018 09:33:38

Left-axis deviation no longer present

Myocardial infarct finding still present



Electronically Signed On 02-23-19 08:57:20 CST by Lauri Rosa

## 2019-02-23 NOTE — HP
Date of Admission:  02/22/2019



Chief Complaint:  Diarrhea.



History Of Present Illness:  This is an 88-year-old female patient, who has colon cancer and recently
 had evaluation done at [facility] and after further evaluation, she was told that she is not a cand
idate for any kind of treatment and as a result of that, no more followup appointment scheduled at [facility].  The patient came to office with her family member, and we talked about it, and we discuss
ed about possibility of hospice care at appropriate time in the future.  She has some history of cons
tipation and takes over-the-counter laxatives on a p.r.n. basis, and the patient reported that on the
 day she took Dulcolax 2 tablets for her constipation problem, which was Wednesday night and after th
at she started having diarrhea, and this diarrhea has not stopped at all, and she is having anywhere 
between 10-15 watery stools, and this is resulting in hemorrhoid problem, and she is feeling extremel
y weak, unable to get out of bed or ambulate, brought into emergency room by her family.  After she w
as evaluated in the ER, she was admitted to the hospital.  I saw her in the emergency room.  She was 
complaining of pain in her hemorrhoid area in the perirectal region, but she was also appearing very 
anxious.  Her granddaughter was with her at bedside.  One dose of alprazolam was ordered after I saw 
her to help with her anxiety problem.



Medications:  She takes Hemocyte plus 1 tablet daily, losartan 50 mg p.o. daily, and metoprolol 25 tw
ice a day.



Review of Systems:

GI:  As mentioned above. 

Constitutional:  As mentioned above. 



All other systems reviewed and negative.



Allergies:  CODEINE, DIAZEPAM, DEMEROL, MORPHINE.



Past Medical History:  Significant for hypertension, pancreatic cyst, abdominal aortic aneurysm, atri
al fibrillation, hypertension, hyperlipidemia, osteoarthritis, diverticulosis, breast cancer, and col
on cancer, and anemia due to chronic GI blood loss from colon cancer.



Past Surgical History:  Significant for left rotator cuff repair in 2001, appendectomy, cholecystecto
my, hysterectomy, knee replacement, right breast mastectomy due to breast cancer.



Family History:  Not pertinent.



Social History:  Negative for smoking or alcohol use.



Physical Examination:

Vital Signs:  When she came into emergency room blood pressure 110/50, pulse 70, respiratory rate 16,
 temperature 98.9, weight 58.51 kg, height 5 feet 5 inches. 

General:  The patient appeared in some distress due to pain as well as very anxious.  Lying in bed on
 the right side in the emergency room. 

HEENT:  Head atraumatic, normocephalic.  Conjunctivae nonerythematous.  Sclerae white.  Mouth, no thr
ush or edema noted.  Ears/Nose, no mass, lesion, discharge noted. 

Neck:  Supple. No JVD, lymph nodes, bruit, thyromegaly noted. 

Lungs:  Bilateral good equal air entry. Clear to auscultation. No rhonchi.  No rales. 

Heart:  Normal heart sounds, no murmur or gallop. 

Abdomen:  Soft, bowel sounds normal. No guarding, rigidity, tenderness, mass, hepatosplenomegaly, dis
tention, or bruit noted. 

Extremities:  No leg edema.  No calf tenderness. 

Skin:  No rash, ulcer, cellulitis. 

Lymphatics:  No lymph node enlargement in neck, supraclavicular, infraclavicular region. 

Neuro:  No focal neurological deficit. 

Chest:  Unremarkable. 

External Genitalia:  Deferred. 

Rectal:  The patient has a hemorrhoid that appears to be thrombosed and very tender to touch.  I did 
not perform a rectal exam, but I just gently  her buttock and looked at it.  There were 2 he
morrhoids that they were outside the anal opening, they were very tender to touch.  The patient was n
oted to have dark green colored liquid stool present.



Laboratory Data:  White count 8.2, hemoglobin 9.8, platelets 184.  Sodium 142, potassium 4.2, chlorid
e 108, bicarb 27, BUN 29, creatinine 1.39, calcium 10.7.  Liver function tests unremarkable.



Impression:  

1.Acute kidney injury.

2.Volume depletion.

3.Diarrhea.

4.Anemia due to chronic gastrointestinal blood loss.

5.Colon cancer.

6.Hypertension.

7.Atrial fibrillation.

8.Abdominal aortic aneurysm.

9.Pancreatic cyst.

10.Hyperlipidemia.

11.Diverticulosis.

12.Breast cancer.

13.Generalized weakness.

14.Debility.



Plan:  Admit the patient to hospital for further evaluation and management of this problem.  The elizabeth
ent is appropriate for inpatient and is expected to spend 2 midnights in hospital.  We will go ahead 
and start her on IV fluid.  Home medications will be continued per order.  I will not give any medica
tions for her diarrhea right now because I am concerned about such medication use causing more troubl
e with constipation down the line, so we will see how she does by tomorrow and then we will decide if
 we need to use such medication or not.  We will repeat blood work tomorrow morning, and the patient 
was advised not to use laxative that she used, which was Dulcolax, and we will have to consider diffe
rent type of stool softener and laxative for her.  We will have Physical Therapy try to help her with
 ambulation tomorrow.  I did discuss advanced directives and DNR order was written in the chart per parisa brewster's decision.





CANDI/HUYEN

DD:  02/23/2019 11:46:13Voice ID:  [id number]

## 2019-02-23 NOTE — PN
Date of Progress Note:  02/23/2019



Subjective:  The patient was seen this morning for followup, lying in bed, not in any distress.  She 
appeared more comfortable this morning than yesterday in terms of anxiety.  She still has some pain f
rom her hemorrhoid and she had about 3-4 diarrhea stool last night, and once since she woke up this m
orning by the time I saw her this morning.  No nausea, no vomiting.  Her granddaughter was present wi
th her at bedside.



Objective:  Vital Signs:  Reviewed. 

HEENT Examination:  Unremarkable. 

Lungs:  Clear to auscultation. 

Heart:  Sounds normal. 

Abdomen:  Soft.  Bowel sounds normal.  No guarding, rigidity, tenderness, distention. 

Extremities:  No leg edema.



Laboratory Data:  White count 6.4, hemoglobin 8.9, platelets 132.  Sodium 144, potassium 4.1, chlorid
e 113, bicarb 27, BUN 22, creatinine 0.85, glucose 72.



Impression:  

1.Acute kidney injury.

2.Volume depletion.

3.Diarrhea.

4.Anemia due to chronic gastrointestinal blood loss.

5.Colon cancer.

6.Generalized weakness.

7.Hypertension.



Plan:  The patient's home medications will be continued including her antihypertensive medications.  
We will continue IV fluid and continue her current diet and we will consider to advance diet probably
 later today.  Physical therapy to help ambulate the patient.  Stool test was ordered for C. diff and
 culture which is pending.  We will start her on some treatment for hemorrhoid using Anusol-HC rectal
 suppository twice a day and I did talk to patient's family member regarding consideration of hospice
 care upon discharge from the hospital as what I am afraid of the patient's condition is deterioratin
g and it is going to continue to deteriorate with her colon cancer problem.  At some point, we will h
ave to worry about bowel obstruction, if her colon cancer continues to grow inside the lumen, then it
 definitely will result in that kind of situation.  But even if it does not with her overall deterior
ating health, she is appropriate for hospice care now and I have encouraged family to have some discu
ssion this weekend and let me know what their decision is, so that way I can provide appropriate help
 upon discharge with referral for hospice care.





CANDI/MODL

DD:  02/23/2019 11:59:33Voice ID:  [id number]

DT:  02/23/2019 13:58:19Report ID:  [id number]

## 2019-02-24 RX ADMIN — Medication SCH EA: at 21:42

## 2019-02-24 RX ADMIN — HYDROCORTISONE ACETATE SCH MG: 25 SUPPOSITORY RECTAL at 21:37

## 2019-02-24 RX ADMIN — Medication SCH EA: at 09:48

## 2019-02-24 RX ADMIN — SODIUM CHLORIDE SCH: 0.9 INJECTION, SOLUTION INTRAVENOUS at 12:46

## 2019-02-24 RX ADMIN — ACETAMINOPHEN PRN MG: 500 TABLET, FILM COATED ORAL at 09:46

## 2019-02-24 RX ADMIN — ACETAMINOPHEN PRN MG: 500 TABLET, FILM COATED ORAL at 18:05

## 2019-02-24 RX ADMIN — SODIUM CHLORIDE SCH MLS: 0.9 INJECTION, SOLUTION INTRAVENOUS at 03:33

## 2019-02-24 RX ADMIN — HYDROCODONE BITARTRATE AND ACETAMINOPHEN PRN TAB: 5; 325 TABLET ORAL at 21:45

## 2019-02-24 RX ADMIN — HYDROCORTISONE ACETATE SCH MG: 25 SUPPOSITORY RECTAL at 09:47

## 2019-02-24 RX ADMIN — SODIUM CHLORIDE SCH MLS: 0.9 INJECTION, SOLUTION INTRAVENOUS at 21:46

## 2019-02-24 RX ADMIN — SODIUM CHLORIDE SCH MLS: 0.9 INJECTION, SOLUTION INTRAVENOUS at 15:08

## 2019-02-24 NOTE — PN
Date of Progress Note:  02/24/2019



Subjective:  The patient was seen this morning for followup.  She was lying in 
bed.  Had 2 watery diarrhea stools during daytime yesterday, 2 at night time 
and 2 so far this morning.  She is having pain in her rectum area secondary to 
hemorrhoid.  No nausea, no vomiting.  She is on clear liquid diet and is 
tolerating that well.



Objective:  Vital Signs:  Reviewed. 

HEENT:  Unremarkable. 

Lungs:  Bilateral good equal air entry.  Clear to auscultation.  No rhonchi.  
No rales. 

Heart:  Sounds normal. 

Abdomen:  Soft.  Bowel sounds normal.  No guarding, rigidity, tenderness, 
distention. 

Extremities:  No leg edema.



Laboratory Data:  Stool C. diff and culture result pending.



Impression:  

1.   Volume depletion.

2.   Dehydration.

3.   Acute kidney injury.

4.   Hypertension.

5.   Colon cancer.

6.   Generalized weakness.

7.   Hemorrhoid, thrombosed.



Plan:  

1.   We will go ahead and continue Anusol-HC rectal suppository per order.  
Advance her diet to regular diet.  Continue current IV fluid, antihypertensive 
medication.  We will follow up on stool test results once it is available.  I 
had a long discussion with the patient's 2 granddaughters today and also talked 
to the patient about hospice care and family is agreeable to go home with the 
hospice care and they would like to use [facility] agency, so I have 
requested Social Service to go ahead and assist family and the patient with 
hospice care arrangements and once that arrangements gets completed, then we 
will be able to discharge her to go home with possibility of discharge in next 
1 or 2 days.  Her overall prognosis is poor and hospice diagnosis is colon 
cancer.  The patient will need 24-hour care at home upon discharge and family 
will communicate with hospice agency and then decide if any other extra help is 
needed with other outside agency.  Tylenol was ordered today for her rectal 
pain due to hemorrhoid.





CANDI/MODL

DD:  02/24/2019 09:57:02   Voice ID:  [id number]

DT:  02/24/2019 13:49:18   Report ID:  [id number]

CORRIE

## 2019-02-25 RX ADMIN — SODIUM CHLORIDE SCH MLS: 0.9 INJECTION, SOLUTION INTRAVENOUS at 17:52

## 2019-02-25 RX ADMIN — HYDROCORTISONE ACETATE SCH: 25 SUPPOSITORY RECTAL at 09:00

## 2019-02-25 RX ADMIN — Medication SCH EA: at 21:05

## 2019-02-25 RX ADMIN — HYDROCODONE BITARTRATE AND ACETAMINOPHEN PRN TAB: 5; 325 TABLET ORAL at 09:29

## 2019-02-25 RX ADMIN — Medication SCH EA: at 09:28

## 2019-02-25 RX ADMIN — HYDROCORTISONE ACETATE SCH MG: 25 SUPPOSITORY RECTAL at 09:00

## 2019-02-25 RX ADMIN — HYDROCORTISONE ACETATE SCH MG: 25 SUPPOSITORY RECTAL at 21:00

## 2019-02-25 RX ADMIN — SODIUM CHLORIDE SCH MLS: 0.9 INJECTION, SOLUTION INTRAVENOUS at 09:26

## 2019-02-25 RX ADMIN — Medication SCH EA: at 09:00

## 2019-02-25 RX ADMIN — HYDROCODONE BITARTRATE AND ACETAMINOPHEN PRN TAB: 5; 325 TABLET ORAL at 21:04

## 2019-02-26 RX ADMIN — SODIUM CHLORIDE SCH MLS: 0.9 INJECTION, SOLUTION INTRAVENOUS at 05:16

## 2019-02-26 RX ADMIN — Medication SCH EA: at 09:00

## 2019-02-26 RX ADMIN — HYDROCORTISONE ACETATE SCH: 25 SUPPOSITORY RECTAL at 09:00

## 2019-02-26 NOTE — PN
Date of Progress Note:  02/25/2019



Subjective:  The patient was seen this morning for followup.  She was feeling better this morning com
pared to yesterday.  Pain is under better control.  Yesterday, we did start her on Norco for pain bec
ause Tylenol was not helping.  Her diarrhea has improved.



Objective:  Vital Signs:  Reviewed. 

HEENT:  Unremarkable. 

Lungs:  Clear to auscultation. 

Heart:  Sounds normal. 

Abdomen:  Soft.  Bowel sounds normal.  No guarding, rigidity, tenderness, or distention. 

Extremities:  No leg edema.



Impression:  

1.Volume depletion.

2.Diarrhea.

3.Colon cancer.

4.Anemia due to chronic gastrointestinal blood loss.

5.Hypertension.



Plan:  We will go ahead and continue current medications.  Diet order, antihypertensive medication, a
nd pain medication.  Social Service will assist the patient and family with hospice care arrangements
, and hopefully all the arrangements will be completed by tomorrow, and we will be able to discharge 
her once arrangement gets completed.  Details were discussed with the patient and the patient's grand
daughter this morning.





CANDI/MODL

DD:  02/25/2019 20:58:57Voice ID:  [id number]

DT:  02/26/2019 02:03:49Report ID:  [id number]

## 2019-02-27 NOTE — DS
Date of Discharge:  02/26/2019



Disposition:  Discharged to go home.



Physical Examination:

HEENT:  Unremarkable. 

Lungs:  Clear to auscultation. 

Heart:  Sounds normal. 

Abdomen:  Soft.  Bowel sounds normal.  No guarding, rigidity, tenderness, distention. 

Extremity:  No leg edema.



Discharge Medication And Instructions:  

1.Continue all prior home medications.

2.Comfort care orders per hospice.

3.The patient to be admitted to hospice care with diagnosis of colon cancer.



Hospital Course:  The 88-year-old female patient who was admitted to the hospital with diarrhea probl
em.  Please see dictated H and P for more information.  After the patient was evaluated in the emerge
ncy room, she was admitted to the hospital with severe diarrhea problem resulting in volume depletion
 and extreme generalized weakness.  The patient was admitted to the hospital with acute kidney injury
 and volume depletion.  Her white count was normal at 8.2, hemoglobin 9.8, platelets 184.  Sodium 142
, potassium 4.2, chloride 108, bicarb 27, BUN 29, creatinine was 1.39.  IV fluid was started and her 
renal function improved.  Diarrhea improved after a couple of days.  She had lot of problem with hemo
rrhoid and Anusol rectal suppository was started, and today she informed me that her pain from the he
morrhoid has subsided completely.  The patient has colon cancer for which she was [facility] this [facility], and she was told by [facility] that she is not a candidate for any kind of
 treatment for her colon cancer.  So, at appropriate time our plan was to consider hospice care and h
er condition has deteriorated, and I am expecting her condition to continue to deteriorate and her li
fe expectancy is less than 6 months if her disease continues to progress at expected rate.  Details w
ere discussed with the patient and the patient's family members and it was recommended for patient to
 be admitted to hospice care and after all the arrangements completed today, she was discharged to go
 home in stable condition.  Overall, prognosis is poor.



Final Diagnoses:  

1.Acute kidney injury.

2.Volume depletion.

3.Hemorrhoids.

4.Diarrhea.

5.Anemia due to chronic gastrointestinal blood loss.

6.Colon cancer.

7.Hypertension.

8.Atrial fibrillation.

9.Pancreatic cyst.

10.Abdominal aortic aneurysm.

11.Hyperlipidemia.

12.Diverticulosis.

13.Breast cancer.

14.Generalized weakness.

15.Debility.





CANDI/MODL

DD:  02/26/2019 19:09:59Voice ID:  [id number]

DT:  02/27/2019 17:57:33Report ID:  [id number]

## 2020-11-29 NOTE — RAD REPORT
EXAM DESCRIPTION:  RAD - Abdomen 1 View ([facility]) - 2/22/2019 1:26 pm

 

CLINICAL HISTORY:  Abdominal pain, diarrhea

 

COMPARISON:  None.

 

FINDINGS:  No gastric dilatation. Several prominent small bowel loops are present. No free air or pne
umatosis. Colon is not abnormally dilated. There is stool and contrast material present distending th
e rectum. The hyperdense material in the rectum may be part of ingested medication. No suspicious bobby
cifications.

 

Prominent bony degenerative changes are present.

 

IMPRESSION:  Prominent small bowel pattern without obstruction or free air. Findings are consistent w
ith a nonspecific enteritis.

 

No free air or pneumatosis identified.
Breast cancer    Diabetes    Diabetes mellitus    Essential hypertension    Hypertension